# Patient Record
Sex: FEMALE | URBAN - METROPOLITAN AREA
[De-identification: names, ages, dates, MRNs, and addresses within clinical notes are randomized per-mention and may not be internally consistent; named-entity substitution may affect disease eponyms.]

---

## 2022-11-08 ENCOUNTER — OFFICE VISIT (OUTPATIENT)
Dept: URBAN - METROPOLITAN AREA CLINIC 82 | Facility: CLINIC | Age: 72
End: 2022-11-08
Payer: COMMERCIAL

## 2022-11-08 ENCOUNTER — DASHBOARD ENCOUNTERS (OUTPATIENT)
Age: 72
End: 2022-11-08

## 2022-11-08 ENCOUNTER — LAB OUTSIDE AN ENCOUNTER (OUTPATIENT)
Dept: URBAN - METROPOLITAN AREA CLINIC 82 | Facility: CLINIC | Age: 72
End: 2022-11-08

## 2022-11-08 VITALS
SYSTOLIC BLOOD PRESSURE: 136 MMHG | DIASTOLIC BLOOD PRESSURE: 86 MMHG | HEART RATE: 61 BPM | WEIGHT: 109 LBS | HEIGHT: 63 IN | BODY MASS INDEX: 19.31 KG/M2 | TEMPERATURE: 98.6 F

## 2022-11-08 DIAGNOSIS — K58.9 IRRITABLE BOWEL SYNDROME, UNSPECIFIED TYPE: ICD-10-CM

## 2022-11-08 DIAGNOSIS — K21.9 GASTROESOPHAGEAL REFLUX DISEASE, UNSPECIFIED WHETHER ESOPHAGITIS PRESENT: ICD-10-CM

## 2022-11-08 DIAGNOSIS — R10.13 DYSPEPSIA: ICD-10-CM

## 2022-11-08 PROBLEM — 10743008: Status: ACTIVE | Noted: 2022-11-08

## 2022-11-08 PROBLEM — 235595009: Status: ACTIVE | Noted: 2022-11-08

## 2022-11-08 PROCEDURE — G9903 PT SCRN TBCO ID AS NON USER: HCPCS | Performed by: INTERNAL MEDICINE

## 2022-11-08 PROCEDURE — G8418 CALC BMI BLW LOW PARAM F/U: HCPCS | Performed by: INTERNAL MEDICINE

## 2022-11-08 PROCEDURE — G8427 DOCREV CUR MEDS BY ELIG CLIN: HCPCS | Performed by: INTERNAL MEDICINE

## 2022-11-08 PROCEDURE — 99204 OFFICE O/P NEW MOD 45 MIN: CPT | Performed by: INTERNAL MEDICINE

## 2022-11-08 RX ORDER — SUCRALFATE 1 G/1
1 TABLET ON AN EMPTY STOMACH TABLET ORAL TWICE A DAY
Qty: 60 TABLET | Refills: 1 | OUTPATIENT
Start: 2022-11-08 | End: 2023-01-07

## 2022-11-08 RX ORDER — PANTOPRAZOLE SODIUM 20 MG/1
1 TABLET TABLET, DELAYED RELEASE ORAL TWICE A DAY
Qty: 180 TABLET | Refills: 1 | OUTPATIENT
Start: 2022-11-08

## 2022-11-08 NOTE — HPI-TODAY'S VISIT:
11/8/2022 Patient is a 72 year old  female who presents for acid reflux. Patient states that she has acid reflux and that she gets a dry mouth. Patent states that she has a lot of gas and that she also gets lower abdominal pain as well. Patient states that she also has some fatigue and that she used to have a colonoscopy and endoscopy done in [facility] every 2 years. Patient states that she takes esomeprazole but also states that she took pantoprazole. We were unable to have a proper conversation with the patient as she complained of multple symptoms and continued to talk over us. Hard to discuss with her.

## 2022-11-16 ENCOUNTER — TELEPHONE ENCOUNTER (OUTPATIENT)
Dept: URBAN - METROPOLITAN AREA CLINIC 82 | Facility: CLINIC | Age: 72
End: 2022-11-16